# Patient Record
Sex: FEMALE | Race: WHITE | NOT HISPANIC OR LATINO | Employment: FULL TIME | URBAN - METROPOLITAN AREA
[De-identification: names, ages, dates, MRNs, and addresses within clinical notes are randomized per-mention and may not be internally consistent; named-entity substitution may affect disease eponyms.]

---

## 2024-10-14 ENCOUNTER — TELEPHONE (OUTPATIENT)
Dept: FAMILY MEDICINE CLINIC | Facility: CLINIC | Age: 34
End: 2024-10-14

## 2024-10-14 ENCOUNTER — TELEPHONE (OUTPATIENT)
Age: 34
End: 2024-10-14

## 2024-10-14 ENCOUNTER — OFFICE VISIT (OUTPATIENT)
Dept: FAMILY MEDICINE CLINIC | Facility: CLINIC | Age: 34
End: 2024-10-14
Payer: OTHER GOVERNMENT

## 2024-10-14 VITALS
WEIGHT: 195.2 LBS | TEMPERATURE: 98 F | HEART RATE: 65 BPM | RESPIRATION RATE: 16 BRPM | DIASTOLIC BLOOD PRESSURE: 74 MMHG | BODY MASS INDEX: 27.94 KG/M2 | SYSTOLIC BLOOD PRESSURE: 108 MMHG | HEIGHT: 70 IN | OXYGEN SATURATION: 98 %

## 2024-10-14 DIAGNOSIS — F32.A ANXIETY AND DEPRESSION: ICD-10-CM

## 2024-10-14 DIAGNOSIS — Z12.4 SCREENING FOR CERVICAL CANCER: ICD-10-CM

## 2024-10-14 DIAGNOSIS — E78.00 PURE HYPERCHOLESTEROLEMIA: ICD-10-CM

## 2024-10-14 DIAGNOSIS — Z11.59 NEED FOR HEPATITIS C SCREENING TEST: ICD-10-CM

## 2024-10-14 DIAGNOSIS — L20.82 FLEXURAL ECZEMA: ICD-10-CM

## 2024-10-14 DIAGNOSIS — F41.9 ANXIETY AND DEPRESSION: ICD-10-CM

## 2024-10-14 DIAGNOSIS — Z11.4 SCREENING FOR HIV (HUMAN IMMUNODEFICIENCY VIRUS): ICD-10-CM

## 2024-10-14 DIAGNOSIS — Z00.00 ANNUAL PHYSICAL EXAM: Primary | ICD-10-CM

## 2024-10-14 PROCEDURE — 99385 PREV VISIT NEW AGE 18-39: CPT | Performed by: INTERNAL MEDICINE

## 2024-10-14 PROCEDURE — 99214 OFFICE O/P EST MOD 30 MIN: CPT | Performed by: INTERNAL MEDICINE

## 2024-10-14 RX ORDER — SERTRALINE HCL 25 MG
25 TABLET ORAL DAILY
COMMUNITY
Start: 2024-08-12 | End: 2024-10-14 | Stop reason: SDUPTHER

## 2024-10-14 RX ORDER — PRENATAL VIT NO.130/IRON/FOLIC 27MG-0.8MG
TABLET ORAL DAILY
COMMUNITY
Start: 2024-08-14

## 2024-10-14 RX ORDER — ACYCLOVIR 400 MG/1
TABLET ORAL AS NEEDED
COMMUNITY
Start: 2024-08-12

## 2024-10-14 RX ORDER — TRIAMCINOLONE ACETONIDE 1 MG/G
CREAM TOPICAL 2 TIMES DAILY
Qty: 15 G | Refills: 1 | Status: SHIPPED | OUTPATIENT
Start: 2024-10-14

## 2024-10-14 RX ORDER — SERTRALINE HCL 25 MG
25 TABLET ORAL DAILY
Qty: 90 TABLET | Refills: 1 | Status: SHIPPED | OUTPATIENT
Start: 2024-10-14

## 2024-10-14 NOTE — PROGRESS NOTES
Assessment/Plan:    Flexural eczema  Continue with as needed triamcinolone cream.    Anxiety and depression  It is overall well-controlled on current dose of Zoloft.  Continue the same.    Pure hypercholesterolemia  LDL is slightly elevated, continue to observe.       Diagnoses and all orders for this visit:    Annual physical exam    Need for hepatitis C screening test    Screening for HIV (human immunodeficiency virus)    Screening for cervical cancer  -     Ambulatory referral to Obstetrics / Gynecology; Future    Anxiety and depression  -     Zoloft 25 MG tablet; Take 1 tablet (25 mg total) by mouth daily    Flexural eczema  -     triamcinolone (KENALOG) 0.1 % cream; Apply topically 2 (two) times a day    Pure hypercholesterolemia    Other orders  -     Discontinue: Zoloft 25 MG tablet; Take 25 mg by mouth daily  -     Prenatal Vit-Fe Fumarate-FA (Prenatal Vitamins) 27-0.8 MG TABS; daily  -     acyclovir (ZOVIRAX) 400 MG tablet; Take by mouth if needed          Subjective:      Patient ID: Jeanie Costa is a 34 y.o. female.    Patient came today for annual checkup, to establish care and to follow-up on some of her chronic medical problems.  Her blood work was reviewed during the current encounter and it came back to be very acceptable.  Screenshots are available in the media.  Vital signs are acceptable except of slightly elevated BMI.  She would like to follow-up with OB/GYN  No known family history of colon cancer or breast cancer.  She will bring me her vaccination records next time.          The following portions of the patient's history were reviewed and updated as appropriate: allergies, current medications, past family history, past medical history, past social history, past surgical history, and problem list.    Review of Systems   Constitutional:  Negative for activity change, appetite change, chills, fatigue and fever.   HENT:  Negative for congestion, ear pain, rhinorrhea and sore throat.   "  Respiratory:  Negative for cough, shortness of breath and wheezing.    Cardiovascular:  Negative for chest pain, palpitations and leg swelling.   Gastrointestinal:  Negative for abdominal distention, abdominal pain, diarrhea, nausea and vomiting.   Genitourinary:  Negative for difficulty urinating, frequency and pelvic pain.   Musculoskeletal:  Negative for arthralgias, back pain and neck pain.   Skin:  Negative for rash.   Neurological:  Negative for dizziness, tremors, weakness, numbness and headaches.   Psychiatric/Behavioral:  Negative for agitation, confusion, decreased concentration and dysphoric mood. The patient is not nervous/anxious.          Objective:      /74 (BP Location: Left arm, Patient Position: Sitting, Cuff Size: Large)   Pulse 65   Temp 98 °F (36.7 °C) (Temporal)   Resp 16   Ht 5' 9.5\" (1.765 m)   Wt 88.5 kg (195 lb 3.2 oz)   LMP 10/06/2024   SpO2 98%   BMI 28.41 kg/m²     No Known Allergies       Current Outpatient Medications:     acyclovir (ZOVIRAX) 400 MG tablet, Take by mouth if needed, Disp: , Rfl:     Prenatal Vit-Fe Fumarate-FA (Prenatal Vitamins) 27-0.8 MG TABS, daily, Disp: , Rfl:     triamcinolone (KENALOG) 0.1 % cream, Apply topically 2 (two) times a day, Disp: 15 g, Rfl: 1    Zoloft 25 MG tablet, Take 1 tablet (25 mg total) by mouth daily, Disp: 90 tablet, Rfl: 1     There are no Patient Instructions on file for this visit.        Physical Exam  Constitutional:       General: She is not in acute distress.     Appearance: Normal appearance. She is not ill-appearing.   HENT:      Nose: No rhinorrhea.   Cardiovascular:      Rate and Rhythm: Normal rate and regular rhythm.      Heart sounds: No murmur heard.     No friction rub. No gallop.   Pulmonary:      Effort: No respiratory distress.      Breath sounds: No wheezing or rhonchi.   Chest:      Chest wall: No tenderness.   Abdominal:      General: There is no distension.      Palpations: There is no mass.      " Tenderness: There is no abdominal tenderness. There is no guarding or rebound.      Hernia: No hernia is present.   Musculoskeletal:         General: No swelling or tenderness.   Lymphadenopathy:      Cervical: No cervical adenopathy.   Skin:     Coloration: Skin is not jaundiced.      Findings: No rash.   Neurological:      Mental Status: She is alert and oriented to person, place, and time.      Motor: No weakness.      Gait: Gait normal.   Psychiatric:         Mood and Affect: Mood normal.         Behavior: Behavior normal.

## 2024-10-14 NOTE — TELEPHONE ENCOUNTER
Pt called in regard to referral for ob/gyn. She is asking if fertility consult can be added to current referral or will new referral need to be placed. She has an upcoming appt with ob/gyn on 10/22. Please advise, pt would like callback with any updates.   Thank you.

## 2024-10-15 PROBLEM — E78.00 PURE HYPERCHOLESTEROLEMIA: Status: ACTIVE | Noted: 2024-10-15

## 2024-10-15 PROBLEM — L20.82 FLEXURAL ECZEMA: Status: ACTIVE | Noted: 2024-10-15

## 2024-10-21 ENCOUNTER — OFFICE VISIT (OUTPATIENT)
Dept: OBGYN CLINIC | Facility: CLINIC | Age: 34
End: 2024-10-21
Payer: OTHER GOVERNMENT

## 2024-10-21 VITALS
SYSTOLIC BLOOD PRESSURE: 126 MMHG | BODY MASS INDEX: 28.06 KG/M2 | DIASTOLIC BLOOD PRESSURE: 80 MMHG | HEIGHT: 70 IN | WEIGHT: 196 LBS

## 2024-10-21 DIAGNOSIS — Z12.4 SCREENING FOR CERVICAL CANCER: ICD-10-CM

## 2024-10-21 DIAGNOSIS — Z31.41 FERTILITY TESTING: Primary | ICD-10-CM

## 2024-10-21 PROBLEM — B00.9 HSV-1 INFECTION: Status: ACTIVE | Noted: 2019-04-24

## 2024-10-21 PROCEDURE — 99204 OFFICE O/P NEW MOD 45 MIN: CPT | Performed by: OBSTETRICS & GYNECOLOGY

## 2024-10-21 NOTE — PROGRESS NOTES
NEW PATIENT    Patient is a 34 y.o.  with Patient's last menstrual period was 10/06/2024 (exact date). who presents requesting evaluation of her fertility.    She reports she has been actively attempting pregnancy for 14 months.   She reports she stopped her OCPs a few years ago, but started actively attempting pregnancy 14 months ago. She was checking her daily temperatures for some time but had difficulty with monitoring. She reports for the last 2 months she started doing ovulation detection kits and it was positive this past month. She reports she is having sex daily and sometimes twice daily during her time of ovulation. She reports she also started taking folic acid daily 5 months ago.   She reports her  had a semen analysis and is awaiting results.   Reviewed with patient that she should be having intercourse every other day from day 10-20 of her cycle as her cycles are every 26-28 days. She should continue folic acid and ovulation detection kits.   Will check Day 3 FSH, LH, E2. Will check day 21 progesterone and will check TSH, PRL and AMH at the same time  Recommend HSG as well. Pt will call with the first day of her menses  Pt reports her  lives in [place] and is in the  so she will be seeing him less frequently. Reviewed the importance of intercourse during peak fertility to increase chances of pregnancy  She will send his semen analysis results when they are available.     Past Medical History:   Diagnosis Date    Chickenpox     Genital herpes     Type 1    Need for HPV vaccine     completed series    Pap smear for cervical cancer screening     no h.o abnormals; 2017 wnl per pt       Past Surgical History:   Procedure Laterality Date    NASAL FRACTURE SURGERY      WISDOM TOOTH EXTRACTION         OB History    Para Term  AB Living   0 0 0 0 0 0   SAB IAB Ectopic Multiple Live Births   0 0 0 0 0   Obstetric Comments   Menarche: 15      Menses: -28/3/super tampon  every 2-3 hours x 1 day and then regular tampon every 4-5 hours         Current Outpatient Medications:     acyclovir (ZOVIRAX) 400 MG tablet, Take by mouth if needed, Disp: , Rfl:     Prenatal Vit-Fe Fumarate-FA (Prenatal Vitamins) 27-0.8 MG TABS, daily, Disp: , Rfl:     triamcinolone (KENALOG) 0.1 % cream, Apply topically 2 (two) times a day, Disp: 15 g, Rfl: 1    Zoloft 25 MG tablet, Take 1 tablet (25 mg total) by mouth daily, Disp: 90 tablet, Rfl: 1    norethindrone-ethinyl estradiol (MICROGESTIN 1/20) 1-20 MG-MCG per tablet, , Disp: , Rfl:     No Known Allergies    Social History     Socioeconomic History    Marital status: /Civil Union     Spouse name: Julio Cesar    Number of children: 0    Years of education: None    Highest education level: Bachelor's degree (e.g., BA, AB, BS)   Occupational History    Occupation:    Tobacco Use    Smoking status: Never    Smokeless tobacco: Never   Vaping Use    Vaping status: Never Used   Substance and Sexual Activity    Alcohol use: Not Currently    Drug use: Never    Sexual activity: Yes     Partners: Male     Birth control/protection: None     Comment: lifetime partners: 20; : 2020   Other Topics Concern    None   Social History Narrative    [employer]: No preference    Accepts blood products     Social Determinants of Health     Financial Resource Strain: Not on file   Food Insecurity: Not on file   Transportation Needs: Not on file   Physical Activity: Not on file   Stress: Not on file (2/11/2021)   Social Connections: Not on file   Intimate Partner Violence: Low Risk  (4/13/2021)    Received [facility]    Intimate Partner Violence     Insults You: Not on file     Threatens You: Not on file     Screams at You: Not on file     Physically Hurt: Not on file     Intimate Partner Violence Score: Not on file   Housing Stability: Not on file       Family History   Problem Relation Age of Onset    No Known Problems Mother     Brain cancer Father 54  "       Glioblastoma    Miscarriages / Stillbirths Sister     Other Sister         pre-eclampsia    No Known Problems Sister     No Known Problems Sister     No Known Problems Brother     No Known Problems Maternal Grandmother     No Known Problems Maternal Grandfather     No Known Problems Paternal Grandmother     No Known Problems Paternal Grandfather     Breast cancer Neg Hx     Ovarian cancer Neg Hx     Colon cancer Neg Hx        Review of Systems   Constitutional:  Negative for chills, fatigue, fever and unexpected weight change.   HENT:  Negative for congestion, mouth sores and sore throat.    Respiratory:  Negative for cough, chest tightness, shortness of breath and wheezing.    Cardiovascular:  Negative for chest pain and palpitations.   Gastrointestinal:  Negative for abdominal distention, abdominal pain, constipation, diarrhea, nausea and vomiting.   Endocrine: Negative for cold intolerance and heat intolerance.   Genitourinary:  Negative for dyspareunia, dysuria, genital sores, menstrual problem, pelvic pain, vaginal bleeding, vaginal discharge and vaginal pain.   Musculoskeletal:  Negative for arthralgias.   Skin:  Negative for color change and rash.   Neurological:  Negative for dizziness, light-headedness and headaches.   Hematological:  Negative for adenopathy.       Blood pressure 126/80, height 5' 9.5\" (1.765 m), weight 88.9 kg (196 lb), last menstrual period 10/06/2024. and Body mass index is 28.53 kg/m².    Physical Exam  Constitutional:       General: She is not in acute distress.     Appearance: Normal appearance. She is well-developed and normal weight. She is not ill-appearing.   HENT:      Head: Normocephalic and atraumatic.   Eyes:      Extraocular Movements: Extraocular movements intact.      Conjunctiva/sclera: Conjunctivae normal.   Neck:      Thyroid: No thyromegaly.      Trachea: No tracheal deviation.   Cardiovascular:      Rate and Rhythm: Normal rate and regular rhythm.      Heart " sounds: Normal heart sounds.   Pulmonary:      Effort: Pulmonary effort is normal. No respiratory distress.      Breath sounds: Normal breath sounds. No stridor. No wheezing or rales.   Abdominal:      General: Abdomen is flat. Bowel sounds are normal. There is no distension.      Palpations: Abdomen is soft. There is no mass.      Tenderness: There is no abdominal tenderness. There is no guarding or rebound.      Hernia: No hernia is present.   Musculoskeletal:         General: No tenderness. Normal range of motion.      Cervical back: Normal range of motion and neck supple.   Lymphadenopathy:      Cervical: No cervical adenopathy.   Skin:     General: Skin is warm.      Findings: No erythema or rash.   Neurological:      Mental Status: She is alert and oriented to person, place, and time.   Psychiatric:         Mood and Affect: Mood normal.         Behavior: Behavior normal.         Thought Content: Thought content normal.         Judgment: Judgment normal.          vulva: normal external genitalia for age and no lesions, masses, epithelial changes, or exudate  vagina: color pink and rugae  well formed rugae  cervix: nullip and no lesions   uterus: NSSC, AF, NT, mobile  adnexa: no masses or tenderness      A/P:  Pt is a 34 y.o.  with      Jeanie was seen today for new patient visit.    Diagnoses and all orders for this visit:    Fertility testing  -     Prolactin; Future  -     TSH, 3rd generation with Free T4 reflex; Future  -     Antimullerian hormone (AMH); Future  -     Progesterone; Future  -     Estradiol; Future  -     Follicle stimulating hormone; Future  -     Luteinizing hormone; Future  -     FL hysterosalpingogram; Future    Screening for cervical cancer  -     Ambulatory referral to Obstetrics / Gynecology    Pt will return for annual examination

## 2024-10-26 ENCOUNTER — APPOINTMENT (OUTPATIENT)
Dept: LAB | Facility: MEDICAL CENTER | Age: 34
End: 2024-10-26
Payer: OTHER GOVERNMENT

## 2024-10-26 DIAGNOSIS — Z31.41 FERTILITY TESTING: ICD-10-CM

## 2024-10-26 LAB
PROGEST SERPL-MCNC: 16.16 NG/ML
PROLACTIN SERPL-MCNC: 18.34 NG/ML (ref 3.34–26.72)
TSH SERPL DL<=0.05 MIU/L-ACNC: 4.2 UIU/ML (ref 0.45–4.5)

## 2024-10-26 PROCEDURE — 84146 ASSAY OF PROLACTIN: CPT

## 2024-10-26 PROCEDURE — 36415 COLL VENOUS BLD VENIPUNCTURE: CPT

## 2024-10-26 PROCEDURE — 84443 ASSAY THYROID STIM HORMONE: CPT

## 2024-10-26 PROCEDURE — 82166 ASSAY ANTI-MULLERIAN HORM: CPT

## 2024-10-26 PROCEDURE — 84144 ASSAY OF PROGESTERONE: CPT

## 2024-10-30 ENCOUNTER — TELEPHONE (OUTPATIENT)
Age: 34
End: 2024-10-30

## 2024-10-30 NOTE — TELEPHONE ENCOUNTER
Attempted to call patient with lab results per Dr. Ken. Unable to reach patient by phone. Left non-detailed voicemail to return call to [phone].

## 2024-10-30 NOTE — TELEPHONE ENCOUNTER
Patient returned call. Reviewed results and provider recommendations. Patient asking if any of the labs she had indicate a possible pregnancy. Informed patient these labs don't show a pregnancy hormone level and she can take home urine pregnancy test when appropriate.     Patient also asking how abnormal thyroid is typically managed in pregnancy. Informed providers may sometimes prescribe medication, but management is dependent on particular patient situation and provider recommendations at that time. Patient verbalized understanding.     Patient asking if office has received fax from fertility clinic of 's semen analysis. RN unable to find form in chart. Contacted clerical and spoke to Alba for assistance, who was also unable to find. Alba informed she will check again tomorrow. Made patient aware of the same and advised patient/ reach back out to fertility clinic to touch base and possibly have them re-fax. Patient verbalized understanding.

## 2024-10-30 NOTE — TELEPHONE ENCOUNTER
----- Message from Anne Ken MD sent at 10/30/2024  3:34 PM EDT -----  Please advise the patient that her prolactin levels are normal. Her thyroid levels are normal for a non pregnant patient, however in pregnancy these levels would need to be treated as we recommend lower levels  in pregnancy. Her progesterone levels indicate that she did ovulate.

## 2024-11-03 LAB — MIS SERPL-MCNC: 3.1 NG/ML

## 2024-11-04 ENCOUNTER — TELEPHONE (OUTPATIENT)
Dept: OBGYN CLINIC | Facility: CLINIC | Age: 34
End: 2024-11-04

## 2024-11-04 NOTE — TELEPHONE ENCOUNTER
Pt called stating she started period 11/04/2024, would like to schedule HSG. Pt questioning when to go for second set of labs. Pt also requesting a call back to discuss semen analysis. Checked [facility] and still no record of the analysis. Pt stated she may be unable to answer phone call due to work, just to leave voicemail with instructions.

## 2024-11-04 NOTE — TELEPHONE ENCOUNTER
Please schedule with radiology for 13:30 on 11/14 and let me and the patient both know about the appointment.

## 2024-11-04 NOTE — TELEPHONE ENCOUNTER
Spoke with Jack in radiology, and he was able to schedule 11/14 @1:30 pm. Called pt and lvm regarding the same and to call back with any concerns or questions.

## 2024-11-04 NOTE — TELEPHONE ENCOUNTER
ESC reply day 3 of menses...11/6  Left detailed message on v/m per patient instruction. Portal reply sent

## 2024-11-04 NOTE — TELEPHONE ENCOUNTER
Patient states she got her period 1 day early ( today) and checking with Dr. Ken if she should get next round of labs  or Wed .   Patient is fine is phone or [employer] message for follow up but needs to know today, as soon as possible.   ESC sent to provider.      Patient would also like provider to check labs on Julio Cesar Costa  89- she will ask the  on her 's [place] [employer] base to fax results to office    (with her name and ) to sort to her Epic chart.

## 2024-11-06 ENCOUNTER — APPOINTMENT (OUTPATIENT)
Dept: LAB | Facility: MEDICAL CENTER | Age: 34
End: 2024-11-06
Payer: OTHER GOVERNMENT

## 2024-11-06 DIAGNOSIS — Z31.41 FERTILITY TESTING: ICD-10-CM

## 2024-11-06 LAB
ESTRADIOL SERPL-MCNC: 119.7 PG/ML
FSH SERPL-ACNC: 8.4 MIU/ML
LH SERPL-ACNC: 7.2 MIU/ML

## 2024-11-06 PROCEDURE — 82670 ASSAY OF TOTAL ESTRADIOL: CPT

## 2024-11-06 PROCEDURE — 36415 COLL VENOUS BLD VENIPUNCTURE: CPT

## 2024-11-06 PROCEDURE — 83001 ASSAY OF GONADOTROPIN (FSH): CPT

## 2024-11-06 PROCEDURE — 83002 ASSAY OF GONADOTROPIN (LH): CPT

## 2024-11-14 ENCOUNTER — TELEPHONE (OUTPATIENT)
Dept: OBGYN CLINIC | Facility: CLINIC | Age: 34
End: 2024-11-14

## 2024-11-14 NOTE — TELEPHONE ENCOUNTER
Called pt and lvm to return call regarding appt that was scheduled for today that pt canceled through mychart.

## 2025-04-14 ENCOUNTER — NURSE TRIAGE (OUTPATIENT)
Age: 35
End: 2025-04-14

## 2025-04-14 ENCOUNTER — TELEPHONE (OUTPATIENT)
Dept: FAMILY MEDICINE CLINIC | Facility: CLINIC | Age: 35
End: 2025-04-14

## 2025-04-14 NOTE — TELEPHONE ENCOUNTER
"FOLLOW UP: Discuss with provider and call patient withiin 24 hours    REASON FOR CONVERSATION: Vaginal Bleeding    SYMPTOMS: heavy vaginal bleeding,clotting, pelvic pain over weekend.     OTHER: Bleeding is much less today and pelvic pain is improved. Aware to return to ED for heavy bleeding, lightheadedness, dizziness or shortness of breath.     Jeanie was evaluated by [facility] ED in [place] on Saturday for heavy vaginal bleeding. She travels due to spouse being in .  Jeanie asking for telemed visit with Dr. Ken whom she has seen in past and HSG recommended. She has not completed yet due to logistic issues.  Difficulty establishing OB/GYN care due to frequent moving-Jeannette is [place] and prefers to follow with DR Ken.    DISPOSITION: Discuss With PCP and Callback by Nurse Today            Reason for Disposition   Passed tissue (e.g., gray-white)     With 24 hours.    Answer Assessment - Initial Assessment Questions  1. BLEEDING SEVERITY: \"Describe the bleeding that you are having.\" \"How much bleeding is there?\"       Heavy bleeding over weekend, soaking through pads within minutes over weekend  2. ONSET: \"When did the bleeding begin?\" \"Is it continuing now?\"      Over weekend   3. MENSTRUAL PERIOD: \"When was the last normal menstrual period?\" \"How is this different than your period?\"      Last month  4. REGULARITY: \"How regular are your periods?\"      Generally regular  5. ABDOMEN PAIN: \"Do you have any pain?\" \"How bad is the pain?\"  (e.g., Scale 0-10; none, mild, moderate, or severe)      Denies pain today, intense cramping on Saturday morning  6. PREGNANCY: \"Is there any chance you are pregnant?\" \"When was your last menstrual period?\"      Denies, HCG in [facility] ED negative  7. BREASTFEEDING: \"Are you breastfeeding?\"      N/a  8. HORMONE MEDICINES: \"Are you taking any hormone medicines, prescription or over-the-counter?\" (e.g., birth control pills, estrogen)      N/a  9. BLOOD THINNER MEDICINES: \"Do " "you take any blood thinners?\" (e.g., Coumadin / warfarin, Pradaxa / dabigatran, aspirin)      denies  10. CAUSE: \"What do you think is causing the bleeding?\" (e.g., recent gyn surgery, recent gyn procedure; known bleeding disorder, cervical cancer, polycystic ovarian disease, fibroids)          unknown  11. HEMODYNAMIC STATUS: \"Are you weak or feeling lightheaded?\" If Yes, ask: \"Can you stand and walk normally?\"         Denies, bleeding is regular menstrual flow today.   12. OTHER SYMPTOMS: \"What other symptoms are you having with the bleeding?\" (e.g., passed tissue, vaginal discharge, fever, menstrual-type cramps)        Passed multiple large clots-largest on Saturday size of a fist    Protocols used: Vaginal Bleeding - Abnormal-Adult-[place]    "

## 2025-04-15 ENCOUNTER — TELEPHONE (OUTPATIENT)
Age: 35
End: 2025-04-15

## 2025-04-15 NOTE — TELEPHONE ENCOUNTER
Unfortunately we are not allowed to perform Telemed visits for people out of state because I do not have a medical licence out of state.

## 2025-04-15 NOTE — TELEPHONE ENCOUNTER
Patient currently residing in \[facility]\"", requests labs to be faxed to fertility specialist.     F: [phone]  [facility]

## 2025-05-07 DIAGNOSIS — F32.A ANXIETY AND DEPRESSION: ICD-10-CM

## 2025-05-07 DIAGNOSIS — F41.9 ANXIETY AND DEPRESSION: ICD-10-CM

## 2025-05-07 RX ORDER — SERTRALINE HYDROCHLORIDE 25 MG/1
25 TABLET, FILM COATED ORAL DAILY
Qty: 90 TABLET | Refills: 1 | Status: SHIPPED | OUTPATIENT
Start: 2025-05-07

## 2025-05-07 NOTE — TELEPHONE ENCOUNTER
LVM for pt to call back to set up an appt to be seen in order to continue with receiving refills on medication